# Patient Record
Sex: MALE | Race: WHITE
[De-identification: names, ages, dates, MRNs, and addresses within clinical notes are randomized per-mention and may not be internally consistent; named-entity substitution may affect disease eponyms.]

---

## 2022-01-06 ENCOUNTER — HOSPITAL ENCOUNTER (EMERGENCY)
Dept: HOSPITAL 96 - M.ERS | Age: 37
Discharge: LEFT BEFORE DISCHARGE | End: 2022-01-06
Payer: COMMERCIAL

## 2022-01-06 VITALS — SYSTOLIC BLOOD PRESSURE: 120 MMHG | DIASTOLIC BLOOD PRESSURE: 74 MMHG

## 2022-01-06 VITALS — HEIGHT: 70 IN | BODY MASS INDEX: 22.91 KG/M2 | WEIGHT: 160.01 LBS

## 2022-01-06 DIAGNOSIS — Z53.21: ICD-10-CM

## 2022-01-06 DIAGNOSIS — R07.89: Primary | ICD-10-CM

## 2022-01-07 NOTE — EKG
[place] [address]
Phone:  [phone]                     ELECTROCARDIOGRAM REPORT      
_______________________________________________________________________________
 
Name:         NINA LARA               Room:                     [facility]#:    [id number]     Account #:     [id number]  
Admission:    22    Attend Phys:                     
Discharge:    22    Date of Birth: [birth year]  
Date of Service: 2242  Report #:      [id number]
        [id number]
_______________________________________________________________________________
THIS REPORT FOR:  //name//                      
 
                         [facility] ED
                                       
Test Date:    2022               Test Time:    07:42:15
Pat Name:     NINA LARA            Department:   
Patient ID:   [id number]            Room:          
Gender:                               Technician:   
:          1985               Requested By: Gage Huber
Order Number: [id number] MD:   Rupesh Felder
                                 Measurements
Intervals                              Axis          
Rate:         63                       P:            54
VT:           144                      QRS:          113
QRSD:         97                       T:            10
QT:           382                                    
QTc:          392                                    
                           Interpretive Statements
Sinus rhythm
Right axis deviation
No previous ECG available for comparison
Electronically Signed On 2022 11:11:54 CST by Rupesh Felder
[email or web address]
 
 
 
 
 
 
 
 
 
 
 
 
 
 
 
 
 
 
 
 
 
  <ELECTRONICALLY SIGNED>
                                           By: Rupesh Felder MD, [facility]      
  22     1111
D: 22 0742   _____________________________________
T: 22   Rupesh Felder MD, FACC        /EPI